# Patient Record
Sex: MALE | Race: WHITE | NOT HISPANIC OR LATINO | Employment: FULL TIME | URBAN - METROPOLITAN AREA
[De-identification: names, ages, dates, MRNs, and addresses within clinical notes are randomized per-mention and may not be internally consistent; named-entity substitution may affect disease eponyms.]

---

## 2019-06-12 ENCOUNTER — OFFICE VISIT (OUTPATIENT)
Dept: FAMILY MEDICINE | Facility: CLINIC | Age: 64
End: 2019-06-12

## 2019-06-12 VITALS
TEMPERATURE: 98 F | OXYGEN SATURATION: 95 % | BODY MASS INDEX: 27.65 KG/M2 | HEIGHT: 66 IN | HEART RATE: 125 BPM | WEIGHT: 172.06 LBS | SYSTOLIC BLOOD PRESSURE: 168 MMHG | DIASTOLIC BLOOD PRESSURE: 88 MMHG

## 2019-06-12 DIAGNOSIS — Z12.11 COLON CANCER SCREENING: ICD-10-CM

## 2019-06-12 DIAGNOSIS — Z13.220 ENCOUNTER FOR LIPID SCREENING FOR CARDIOVASCULAR DISEASE: ICD-10-CM

## 2019-06-12 DIAGNOSIS — L02.92 BOIL: ICD-10-CM

## 2019-06-12 DIAGNOSIS — R03.0 ELEVATED BLOOD PRESSURE READING: Primary | ICD-10-CM

## 2019-06-12 DIAGNOSIS — Z13.6 ENCOUNTER FOR LIPID SCREENING FOR CARDIOVASCULAR DISEASE: ICD-10-CM

## 2019-06-12 DIAGNOSIS — Z11.59 NEED FOR HEPATITIS C SCREENING TEST: ICD-10-CM

## 2019-06-12 PROCEDURE — 99203 PR OFFICE/OUTPT VISIT, NEW, LEVL III, 30-44 MIN: ICD-10-PCS | Mod: S$GLB,,, | Performed by: FAMILY MEDICINE

## 2019-06-12 PROCEDURE — 99203 OFFICE O/P NEW LOW 30 MIN: CPT | Mod: S$GLB,,, | Performed by: FAMILY MEDICINE

## 2019-06-12 NOTE — LETTER
June 12, 2019      [address] [address]  Phone: [phone]  Fax: [phone]       Patient: Merlin Meyer   YOB: 1955  Date of Visit: 06/12/2019    To Whom It May Concern:    Madison Meyer  was at [facility] on 06/12/2019. He may return to work/school on 06/12/2019 with no restrictions. If you have any questions or concerns, or if I can be of further assistance, please do not hesitate to contact me.    Sincerely,    Citlaly Friedman, DO

## 2019-06-12 NOTE — PROGRESS NOTES
Chief Complaint  Chief Complaint   Patient presents with    Establish Care     cyst on leg        HPI  Mrelin Meyer is a 64 y.o. male with multiple medical diagnoses as listed in the medical history and problem list that presents to [facility].  He was recently seen in [facility] with a boil on his leg.  The lesion was drained and he was treated with an antibiotic.  The lesion has healed completely now and he needs a note to return to work.     He does not have a PCP.  His blood pressure is elevated.  He reports no previous history of hypertension.  He denies chest pain , palpitations, headaches or dizziness.        PAST MEDICAL HISTORY:  History reviewed. No pertinent past medical history.    PAST SURGICAL HISTORY:  History reviewed. No pertinent surgical history.    SOCIAL HISTORY:  Social History     Socioeconomic History    Marital status: Single     Spouse name: Not on file    Number of children: Not on file    Years of education: Not on file    Highest education level: Not on file   Occupational History    Not on file   Social Needs    Financial resource strain: Not on file    Food insecurity:     Worry: Not on file     Inability: Not on file    Transportation needs:     Medical: Not on file     Non-medical: Not on file   Tobacco Use    Smoking status: Current Every Day Smoker     Types: Cigarettes    Smokeless tobacco: Never Used   Substance and Sexual Activity    Alcohol use: Yes    Drug use: Never    Sexual activity: Not on file   Lifestyle    Physical activity:     Days per week: Not on file     Minutes per session: Not on file    Stress: Not at all   Relationships    Social connections:     Talks on phone: Not on file     Gets together: Not on file     Attends Hindu service: Not on file     Active member of club or organization: Not on file     Attends meetings of clubs or organizations: Not on file     Relationship status: Not on file   Other Topics Concern    Not on file   Social  "History Narrative    Not on file       FAMILY HISTORY:  History reviewed. No pertinent family history.    ALLERGIES AND MEDICATIONS: updated and reviewed.  Review of patient's allergies indicates:  No Known Allergies  No current outpatient medications on file.     No current facility-administered medications for this visit.          ROS  Review of Systems   Constitutional: Negative for activity change, appetite change, chills and fatigue.   HENT: Negative for congestion, ear discharge, hearing loss, mouth sores, rhinorrhea, sinus pain and trouble swallowing.    Eyes: Negative for photophobia, pain, discharge and visual disturbance.   Respiratory: Negative for cough, chest tightness, shortness of breath and wheezing.    Cardiovascular: Negative for chest pain, palpitations and leg swelling.   Gastrointestinal: Negative for abdominal distention, abdominal pain, blood in stool, constipation, diarrhea, nausea and vomiting.   Genitourinary: Negative for difficulty urinating, dysuria and flank pain.   Musculoskeletal: Negative for arthralgias, back pain, gait problem, joint swelling and myalgias.   Skin: Negative for color change and rash.   Neurological: Negative for dizziness, tremors, speech difficulty, light-headedness, numbness and headaches.   Psychiatric/Behavioral: Negative for behavioral problems, confusion, hallucinations, sleep disturbance and suicidal ideas.           PHYSICAL EXAM  Vitals:    06/12/19 1448   BP: (!) 168/88   BP Location: Left arm   Patient Position: Sitting   Pulse: (!) 125   Temp: 98.2 °F (36.8 °C)   TempSrc: Oral   SpO2: 95%   Weight: 78 kg (172 lb 1.1 oz)   Height: 5' 6" (1.676 m)    Body mass index is 27.77 kg/m².  Weight: 78 kg (172 lb 1.1 oz)   Height: 5' 6" (167.6 cm)     Physical Exam   Constitutional: He appears well-developed.   HENT:   Head: Normocephalic.   Eyes: Conjunctivae are normal.   Neck: Normal range of motion. Neck supple.   Cardiovascular: Normal rate and regular " rhythm.   Pulmonary/Chest: Effort normal and breath sounds normal.   Neurological: He is alert.   Skin: Skin is warm.   Boil on left posterior thigh has resolved completely.    Psychiatric: He has a normal mood and affect.   Nursing note and vitals reviewed.        Health Maintenance       Date Due Completion Date    Hepatitis C Screening 1955 ---    Lipid Panel 1955 ---    TETANUS VACCINE 04/09/1974 ---    Pneumococcal Vaccine (Medium Risk) (1 of 1 - PPSV23) 04/09/1975 ---    Shingles Vaccine (1 of 2) 10/31/2019 (Originally 4/9/2006) ---    Colonoscopy 11/29/2019 (Originally 4/9/2006) ---    Influenza Vaccine 08/01/2019 ---            Assessment & Plan      Sim was seen today for establish care.    Diagnoses and all orders for this visit:    Elevated blood pressure reading  -     Comprehensive metabolic panel; Future  -     CBC auto differential; Future  -     TSH; Future  - Return in 2-3 weeks for blood pressure check .     Colon cancer screening  -     Case request GI: COLONOSCOPY    Need for hepatitis C screening test  -     Hepatitis C antibody; Future    Encounter for lipid screening for cardiovascular disease  -     Lipid panel; Future    Boil   - Resolved.  No further treatment needed.  May return to work.         Follow-up: No follow-ups on file.

## 2019-06-20 ENCOUNTER — TELEPHONE (OUTPATIENT)
Dept: GASTROENTEROLOGY | Facility: CLINIC | Age: 64
End: 2019-06-20

## 2019-09-26 ENCOUNTER — TELEPHONE (OUTPATIENT)
Dept: GASTROENTEROLOGY | Facility: CLINIC | Age: 64
End: 2019-09-26